# Patient Record
Sex: MALE | Race: WHITE | Employment: FULL TIME | URBAN - METROPOLITAN AREA
[De-identification: names, ages, dates, MRNs, and addresses within clinical notes are randomized per-mention and may not be internally consistent; named-entity substitution may affect disease eponyms.]

---

## 2022-04-17 ENCOUNTER — APPOINTMENT (OUTPATIENT)
Dept: GENERAL RADIOLOGY | Age: 47
End: 2022-04-17
Attending: EMERGENCY MEDICINE
Payer: COMMERCIAL

## 2022-04-17 ENCOUNTER — HOSPITAL ENCOUNTER (EMERGENCY)
Age: 47
Discharge: HOME | End: 2022-04-17
Attending: EMERGENCY MEDICINE | Admitting: EMERGENCY MEDICINE
Payer: COMMERCIAL

## 2022-04-17 ENCOUNTER — APPOINTMENT (OUTPATIENT)
Dept: CT IMAGING | Age: 47
End: 2022-04-17
Attending: EMERGENCY MEDICINE
Payer: COMMERCIAL

## 2022-04-17 VITALS
HEART RATE: 61 BPM | SYSTOLIC BLOOD PRESSURE: 126 MMHG | BODY MASS INDEX: 23.67 KG/M2 | DIASTOLIC BLOOD PRESSURE: 75 MMHG | HEIGHT: 70 IN | TEMPERATURE: 97.8 F | WEIGHT: 165.34 LBS | RESPIRATION RATE: 20 BRPM | OXYGEN SATURATION: 97 %

## 2022-04-17 DIAGNOSIS — S42.031A CLOSED DISPLACED FRACTURE OF ACROMIAL END OF RIGHT CLAVICLE, INITIAL ENCOUNTER: Primary | ICD-10-CM

## 2022-04-17 LAB
ALBUMIN SERPL-MCNC: 3.8 G/DL (ref 3.5–5)
ALBUMIN/GLOB SERPL: 1.3 {RATIO} (ref 1.1–2.2)
ALP SERPL-CCNC: 103 U/L (ref 45–117)
ALT SERPL-CCNC: 39 U/L (ref 12–78)
ANION GAP SERPL CALC-SCNC: 3 MMOL/L (ref 5–15)
AST SERPL-CCNC: 23 U/L (ref 15–37)
BASOPHILS # BLD: 0.1 K/UL (ref 0–0.1)
BASOPHILS NFR BLD: 1 % (ref 0–1)
BILIRUB SERPL-MCNC: 0.5 MG/DL (ref 0.2–1)
BUN SERPL-MCNC: 15 MG/DL (ref 6–20)
BUN/CREAT SERPL: 18 (ref 12–20)
CALCIUM SERPL-MCNC: 8.9 MG/DL (ref 8.5–10.1)
CHLORIDE SERPL-SCNC: 108 MMOL/L (ref 97–108)
CO2 SERPL-SCNC: 26 MMOL/L (ref 21–32)
CREAT SERPL-MCNC: 0.82 MG/DL (ref 0.7–1.3)
DIFFERENTIAL METHOD BLD: ABNORMAL
EOSINOPHIL # BLD: 0.1 K/UL (ref 0–0.4)
EOSINOPHIL NFR BLD: 1 % (ref 0–7)
ERYTHROCYTE [DISTWIDTH] IN BLOOD BY AUTOMATED COUNT: 11.5 % (ref 11.5–14.5)
GLOBULIN SER CALC-MCNC: 3 G/DL (ref 2–4)
GLUCOSE SERPL-MCNC: 105 MG/DL (ref 65–100)
HCT VFR BLD AUTO: 37.7 % (ref 36.6–50.3)
HGB BLD-MCNC: 13.3 G/DL (ref 12.1–17)
IMM GRANULOCYTES # BLD AUTO: 0.1 K/UL (ref 0–0.04)
IMM GRANULOCYTES NFR BLD AUTO: 1 % (ref 0–0.5)
LYMPHOCYTES # BLD: 1.5 K/UL (ref 0.8–3.5)
LYMPHOCYTES NFR BLD: 11 % (ref 12–49)
MCH RBC QN AUTO: 32 PG (ref 26–34)
MCHC RBC AUTO-ENTMCNC: 35.3 G/DL (ref 30–36.5)
MCV RBC AUTO: 90.8 FL (ref 80–99)
MONOCYTES # BLD: 0.8 K/UL (ref 0–1)
MONOCYTES NFR BLD: 7 % (ref 5–13)
NEUTS SEG # BLD: 10.3 K/UL (ref 1.8–8)
NEUTS SEG NFR BLD: 79 % (ref 32–75)
NRBC # BLD: 0 K/UL (ref 0–0.01)
NRBC BLD-RTO: 0 PER 100 WBC
PLATELET # BLD AUTO: 213 K/UL (ref 150–400)
PMV BLD AUTO: 9.4 FL (ref 8.9–12.9)
POTASSIUM SERPL-SCNC: 4 MMOL/L (ref 3.5–5.1)
PROT SERPL-MCNC: 6.8 G/DL (ref 6.4–8.2)
RBC # BLD AUTO: 4.15 M/UL (ref 4.1–5.7)
SODIUM SERPL-SCNC: 137 MMOL/L (ref 136–145)
TROPONIN-HIGH SENSITIVITY: 11 NG/L (ref 0–76)
WBC # BLD AUTO: 12.9 K/UL (ref 4.1–11.1)

## 2022-04-17 PROCEDURE — 99285 EMERGENCY DEPT VISIT HI MDM: CPT

## 2022-04-17 PROCEDURE — 84484 ASSAY OF TROPONIN QUANT: CPT

## 2022-04-17 PROCEDURE — 96375 TX/PRO/DX INJ NEW DRUG ADDON: CPT

## 2022-04-17 PROCEDURE — 73080 X-RAY EXAM OF ELBOW: CPT

## 2022-04-17 PROCEDURE — 36415 COLL VENOUS BLD VENIPUNCTURE: CPT

## 2022-04-17 PROCEDURE — 74011000636 HC RX REV CODE- 636: Performed by: STUDENT IN AN ORGANIZED HEALTH CARE EDUCATION/TRAINING PROGRAM

## 2022-04-17 PROCEDURE — 73030 X-RAY EXAM OF SHOULDER: CPT

## 2022-04-17 PROCEDURE — 96374 THER/PROPH/DIAG INJ IV PUSH: CPT

## 2022-04-17 PROCEDURE — 74011250637 HC RX REV CODE- 250/637: Performed by: EMERGENCY MEDICINE

## 2022-04-17 PROCEDURE — 74011250636 HC RX REV CODE- 250/636: Performed by: EMERGENCY MEDICINE

## 2022-04-17 PROCEDURE — 80053 COMPREHEN METABOLIC PANEL: CPT

## 2022-04-17 PROCEDURE — 85025 COMPLETE CBC W/AUTO DIFF WBC: CPT

## 2022-04-17 PROCEDURE — 71260 CT THORAX DX C+: CPT

## 2022-04-17 RX ORDER — OXYCODONE AND ACETAMINOPHEN 5; 325 MG/1; MG/1
1 TABLET ORAL
Status: COMPLETED | OUTPATIENT
Start: 2022-04-17 | End: 2022-04-17

## 2022-04-17 RX ORDER — MORPHINE SULFATE 10 MG/ML
6 INJECTION, SOLUTION INTRAMUSCULAR; INTRAVENOUS
Status: COMPLETED | OUTPATIENT
Start: 2022-04-17 | End: 2022-04-17

## 2022-04-17 RX ORDER — MORPHINE SULFATE 10 MG/ML
INJECTION, SOLUTION INTRAMUSCULAR; INTRAVENOUS
Status: DISCONTINUED
Start: 2022-04-17 | End: 2022-04-17 | Stop reason: HOSPADM

## 2022-04-17 RX ORDER — HYDROMORPHONE HYDROCHLORIDE 1 MG/ML
1 INJECTION, SOLUTION INTRAMUSCULAR; INTRAVENOUS; SUBCUTANEOUS
Status: COMPLETED | OUTPATIENT
Start: 2022-04-17 | End: 2022-04-17

## 2022-04-17 RX ORDER — OXYCODONE AND ACETAMINOPHEN 5; 325 MG/1; MG/1
1 TABLET ORAL
Qty: 30 TABLET | Refills: 0 | Status: SHIPPED | OUTPATIENT
Start: 2022-04-17 | End: 2022-04-19 | Stop reason: CLARIF

## 2022-04-17 RX ADMIN — HYDROMORPHONE HYDROCHLORIDE 1 MG: 1 INJECTION, SOLUTION INTRAMUSCULAR; INTRAVENOUS; SUBCUTANEOUS at 15:37

## 2022-04-17 RX ADMIN — IOPAMIDOL 100 ML: 612 INJECTION, SOLUTION INTRAVENOUS at 15:14

## 2022-04-17 RX ADMIN — OXYCODONE AND ACETAMINOPHEN 1 TABLET: 5; 325 TABLET ORAL at 17:09

## 2022-04-17 RX ADMIN — MORPHINE SULFATE 6 MG: 10 INJECTION INTRAVENOUS at 13:56
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
## 2022-04-18 PROBLEM — S42.031D CLOSED DISPLACED FRACTURE OF ACROMIAL END OF RIGHT CLAVICLE WITH ROUTINE HEALING: Status: ACTIVE | Noted: 2022-04-18
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
## 2022-04-19 ENCOUNTER — OFFICE VISIT (OUTPATIENT)
Dept: ORTHOPEDIC SURGERY | Age: 47
End: 2022-04-19
Payer: COMMERCIAL

## 2022-04-19 VITALS — WEIGHT: 150 LBS | HEIGHT: 70 IN | BODY MASS INDEX: 21.47 KG/M2

## 2022-04-19 DIAGNOSIS — S42.031D CLOSED DISPLACED FRACTURE OF ACROMIAL END OF RIGHT CLAVICLE WITH ROUTINE HEALING: Primary | ICD-10-CM

## 2022-04-19 PROCEDURE — 99204 OFFICE O/P NEW MOD 45 MIN: CPT | Performed by: ORTHOPAEDIC SURGERY

## 2022-04-19 RX ORDER — CEPHALEXIN 500 MG/1
500 CAPSULE ORAL 4 TIMES DAILY
Qty: 30 CAPSULE | Refills: 0 | Status: SHIPPED | OUTPATIENT
Start: 2022-04-19 | End: 2022-04-29

## 2022-04-19 RX ORDER — FLUTICASONE PROPIONATE AND SALMETEROL 50; 250 UG/1; UG/1
POWDER RESPIRATORY (INHALATION)
COMMUNITY
Start: 2022-02-18

## 2022-04-19 RX ORDER — CETIRIZINE HCL 10 MG
10 TABLET ORAL DAILY
COMMUNITY
Start: 2022-01-28

## 2022-04-19 RX ORDER — ESCITALOPRAM OXALATE 10 MG/1
TABLET ORAL
COMMUNITY
Start: 2022-01-28

## 2022-04-19 RX ORDER — OXYCODONE AND ACETAMINOPHEN 5; 325 MG/1; MG/1
1 TABLET ORAL
Qty: 42 TABLET | Refills: 0 | Status: SHIPPED | OUTPATIENT
Start: 2022-04-19 | End: 2022-04-25

## 2022-04-25 DIAGNOSIS — Z09 S/P ORTHOPEDIC SURGERY, FOLLOW-UP EXAM: Primary | ICD-10-CM

## 2022-04-25 DIAGNOSIS — S42.031D CLOSED DISPLACED FRACTURE OF ACROMIAL END OF RIGHT CLAVICLE WITH ROUTINE HEALING: ICD-10-CM

## 2022-04-25 RX ORDER — OXYCODONE AND ACETAMINOPHEN 5; 325 MG/1; MG/1
1 TABLET ORAL
Qty: 28 TABLET | Refills: 0 | Status: SHIPPED | OUTPATIENT
Start: 2022-04-25 | End: 2022-04-29

## 2022-04-25 NOTE — TELEPHONE ENCOUNTER
Received a call from wife asking for a refill on patient's pain med. We do not have signed consent to speak with wife. I phoned patient and advised we could send in a few but he needs to reduce the amount he is taking. I advised him that he could also take up to 800mg Ibuprofen q 8hrs. Patient stated he had been taking 2 percocet q 4hrs. I advised 1 percoceet q 8hrs along with the ibuprofen alternating the 2. Dr. Elizabeth Moreno agrees with plan. I also advised patient that insurance may not cover pain med right yet as it is too soon.

## 2022-04-27 NOTE — PROGRESS NOTES
ASSESSMENT/PLAN:  Below is the assessment and plan developed based on review of pertinent history, physical exam, labs, studies, and medications. 1. Closed displaced fracture of acromial end of right clavicle with routine healing  -     XR CLAVICLE RT; Future      Return in about 3 weeks (around 2022). After discussing treatment options, we have decided to proceed with a home exercise program for rehabilitation of the shoulder. We reviewed active elbow range of motion and postural exercises today in the office. We went over the arthroscopic pictures and during today´s visit. He has absorbable sutures and Steri-Strips in place. He understands that these will fall off on their own. We will continue with ice and elevation of the shoulder to decrease swelling and pain. We will continue to utilize early mobilization and mechanical prophylaxis to reduce the chances of a deep vein thrombosis. We will wean them off any narcotic medications and progress to anti-inflammatories and Tylenol as long as there are no contraindications to these medications. We also discussed the risk and benefits and common side effects of taking these medications at today´s visit. We also had a discussion regarding not driving while on narcotic medications and while impaired from a surgical or medical condition. I will see them back in three weeks time to evaluate their progress. They will call us in the interim if they have any questions or concerns prior to their follow up visit. SUBJECTIVE/OBJECTIVE:  Truong Parra (: 1975) is a 49 y.o. male, patient,here for evaluation of the Surgical Follow-up and Clavicle pain (right)  . Patient returns today for follow-up of his right shoulder. He underwent right shoulder open reduction internal fixation of his distal third clavicle fracture on 2022. He has been in his sling. Has been icing elevating. He denies any numbness tingling erythema or warmth.   He states his pain is greatly improved over the last few days. Sleep is still difficult for him. He is trying to wean off of the narcotics and switch over to anti-inflammatories. States he is mostly just taking narcotics at night to help with sleep but is taking Advil during the day. Physical Exam    Examination of the operative shoulder reveals that the incisions are healing well, without evidence of drainage, erythema or warmth. There is limited range of motion secondary to discomfort. Strength is 5/5 distally. There is no tenderness at the elbow and wrist. Sensation is intact to light touch distally and there is a brisk capillary refill. Imagin views of the right clavicle show good alignment of his fracture. No Known Allergies    Current Outpatient Medications   Medication Sig    cetirizine (ZYRTEC) 10 mg tablet Take 10 mg by mouth daily.  escitalopram oxalate (LEXAPRO) 10 mg tablet     Advair Diskus 250-50 mcg/dose diskus inhaler INHALE 1 PUFF BY MOUTH TWICE DAILY. RINSE MOUTH WITH WATER AFTER USE FOR AFTERTASTE AND INCIDENCE OF CANDIDIASIS. DO NOT SWALLOW     No current facility-administered medications for this visit. Past Medical History:   Diagnosis Date    Asthma        History reviewed. No pertinent surgical history. History reviewed. No pertinent family history.     Social History     Socioeconomic History    Marital status:      Spouse name: Not on file    Number of children: Not on file    Years of education: Not on file    Highest education level: Not on file   Occupational History    Not on file   Tobacco Use    Smoking status: Not on file    Smokeless tobacco: Never Used   Vaping Use    Vaping Use: Never used   Substance and Sexual Activity    Alcohol use: Never    Drug use: Yes     Types: Marijuana    Sexual activity: Not on file   Other Topics Concern    Not on file   Social History Narrative    Not on file     Social Determinants of Health Financial Resource Strain:     Difficulty of Paying Living Expenses: Not on file   Food Insecurity:     Worried About Running Out of Food in the Last Year: Not on file    [place] of Food in the Last Year: Not on file   Transportation Needs:     Lack of Transportation (Medical): Not on file    Lack of Transportation (Non-Medical): Not on file   Physical Activity:     Days of Exercise per Week: Not on file    Minutes of Exercise per Session: Not on file   Stress:     Feeling of Stress : Not on file   Social Connections:     Frequency of Communication with Friends and Family: Not on file    Frequency of Social Gatherings with Friends and Family: Not on file    Attends Sabianism Services: Not on file    Active Member of [address] [place] [address] or Organizations: Not on file    Attends Club or Organization Meetings: Not on file    Marital Status: Not on file   Intimate Partner Violence:     Fear of Current or Ex-Partner: Not on file    Emotionally Abused: Not on file    Physically Abused: Not on file    Sexually Abused: Not on file   Housing Stability:     Unable to Pay for Housing in the Last Year: Not on file    Number of [place] in the Last Year: Not on file    Unstable Housing in the Last Year: Not on file       Review of Systems    No flowsheet data found. Vitals:  Ht 5' 10\" (1.778 m)   Wt 150 lb (68 kg)   BMI 21.52 kg/m²    Body mass index is 21.52 kg/m². An electronic signature was used to authenticate this note.   -- Jersey Chaudhary MD

## 2022-04-29 ENCOUNTER — OFFICE VISIT (OUTPATIENT)
Dept: ORTHOPEDIC SURGERY | Age: 47
End: 2022-04-29
Payer: COMMERCIAL

## 2022-04-29 VITALS — WEIGHT: 150 LBS | BODY MASS INDEX: 21.47 KG/M2 | HEIGHT: 70 IN

## 2022-04-29 DIAGNOSIS — S42.031D CLOSED DISPLACED FRACTURE OF ACROMIAL END OF RIGHT CLAVICLE WITH ROUTINE HEALING: Primary | ICD-10-CM

## 2022-04-29 PROCEDURE — 99024 POSTOP FOLLOW-UP VISIT: CPT | Performed by: ORTHOPAEDIC SURGERY

## 2022-05-15 NOTE — PROGRESS NOTES
ASSESSMENT/PLAN:  Below is the assessment and plan developed based on review of pertinent history, physical exam, labs, studies, and medications. 1. Closed displaced fracture of acromial end of right clavicle with routine healing  -     XR CLAVICLE RT; Future      Return in about 4 weeks    After discussing treatment options, we have decided to proceed with a home exercise program for rehabilitation of the shoulder. We reviewed active elbow range of motion and postural exercises today in the office. We went over the arthroscopic pictures and during today´s visit. He has absorbable sutures and Steri-Strips in place. He understands that these will fall off on their own. We will continue with ice and elevation of the shoulder to decrease swelling and pain. We will continue to utilize early mobilization and mechanical prophylaxis to reduce the chances of a deep vein thrombosis. We will wean them off any narcotic medications and progress to anti-inflammatories and Tylenol as long as there are no contraindications to these medications. We also discussed the risk and benefits and common side effects of taking these medications at today´s visit. We also had a discussion regarding not driving while on narcotic medications and while impaired from a surgical or medical condition. I will see them back in three weeks time to evaluate their progress. They will call us in the interim if they have any questions or concerns prior to their follow up visit. SUBJECTIVE/OBJECTIVE:  Juany Tariq (: 1975) is a 49 y.o. male, patient,here for evaluation of the Surgical Follow-up and Clavicle pain (right)  . Patient returns today for follow-up of his right shoulder. He underwent right shoulder open reduction internal fixation of his distal third clavicle fracture on 2022. He has been in his sling. Has been icing elevating. He does have some numbness below the incision.   He states his pain is greatly improved over the last few days. Physical Exam    Examination of the operative shoulder reveals that the incisions are healing well, without evidence of drainage, erythema or warmth. There is limited range of motion secondary to discomfort. Strength is 5/5 distally. There is no tenderness at the elbow and wrist. Sensation is intact to light touch distally and there is a brisk capillary refill. Imagin views of the right clavicle show good alignment of his fracture without any interval change from previous x-rays. No Known Allergies    Current Outpatient Medications   Medication Sig    cetirizine (ZYRTEC) 10 mg tablet Take 10 mg by mouth daily.  escitalopram oxalate (LEXAPRO) 10 mg tablet     Advair Diskus 250-50 mcg/dose diskus inhaler INHALE 1 PUFF BY MOUTH TWICE DAILY. RINSE MOUTH WITH WATER AFTER USE FOR AFTERTASTE AND INCIDENCE OF CANDIDIASIS. DO NOT SWALLOW     No current facility-administered medications for this visit. Past Medical History:   Diagnosis Date    Asthma        History reviewed. No pertinent surgical history. History reviewed. No pertinent family history.     Social History     Socioeconomic History    Marital status:      Spouse name: Not on file    Number of children: Not on file    Years of education: Not on file    Highest education level: Not on file   Occupational History    Not on file   Tobacco Use    Smoking status: Not on file    Smokeless tobacco: Never Used   Vaping Use    Vaping Use: Never used   Substance and Sexual Activity    Alcohol use: Never    Drug use: Yes     Types: Marijuana    Sexual activity: Not on file   Other Topics Concern    Not on file   Social History Narrative    Not on file     Social Determinants of Health     Financial Resource Strain:     Difficulty of Paying Living Expenses: Not on file   Food Insecurity:     Worried About [address] in the Last Year: Not on file    [address] in the Last Year: Not on file   Transportation Needs:     Lack of Transportation (Medical): Not on file    Lack of Transportation (Non-Medical): Not on file   Physical Activity:     Days of Exercise per Week: Not on file    Minutes of Exercise per Session: Not on file   Stress:     Feeling of Stress : Not on file   Social Connections:     Frequency of Communication with Friends and Family: Not on file    Frequency of Social Gatherings with Friends and Family: Not on file    Attends Yarsani Services: Not on file    Active Member of [address] [place] [address] [employer] or Organizations: Not on file    Attends Club or Organization Meetings: Not on file    Marital Status: Not on file   Intimate Partner Violence:     Fear of Current or Ex-Partner: Not on file    Emotionally Abused: Not on file    Physically Abused: Not on file    Sexually Abused: Not on file   Housing Stability:     Unable to Pay for Housing in the Last Year: Not on file    Number of [place] in the Last Year: Not on file    Unstable Housing in the Last Year: Not on file       Review of Systems    No flowsheet data found. Vitals:  Ht 5' 10\" (1.778 m)   Wt 150 lb (68 kg)   BMI 21.52 kg/m²    Body mass index is 21.52 kg/m². An electronic signature was used to authenticate this note.   -- Ike Urrutia MD

## 2022-05-16 ENCOUNTER — OFFICE VISIT (OUTPATIENT)
Dept: ORTHOPEDIC SURGERY | Age: 47
End: 2022-05-16
Payer: COMMERCIAL

## 2022-05-16 VITALS — HEIGHT: 70 IN | WEIGHT: 150 LBS | BODY MASS INDEX: 21.47 KG/M2

## 2022-05-16 DIAGNOSIS — S42.031D CLOSED DISPLACED FRACTURE OF ACROMIAL END OF RIGHT CLAVICLE WITH ROUTINE HEALING: Primary | ICD-10-CM

## 2022-05-16 PROCEDURE — 99024 POSTOP FOLLOW-UP VISIT: CPT | Performed by: ORTHOPAEDIC SURGERY

## 2022-06-15 ENCOUNTER — OFFICE VISIT (OUTPATIENT)
Dept: ORTHOPEDIC SURGERY | Age: 47
End: 2022-06-15
Payer: COMMERCIAL

## 2022-06-15 VITALS — BODY MASS INDEX: 21.47 KG/M2 | HEIGHT: 70 IN | WEIGHT: 150 LBS

## 2022-06-15 DIAGNOSIS — S42.031D CLOSED DISPLACED FRACTURE OF ACROMIAL END OF RIGHT CLAVICLE WITH ROUTINE HEALING: Primary | ICD-10-CM

## 2022-06-15 DIAGNOSIS — M25.521 ELBOW PAIN, RIGHT: ICD-10-CM

## 2022-06-15 PROCEDURE — 99024 POSTOP FOLLOW-UP VISIT: CPT | Performed by: ORTHOPAEDIC SURGERY

## 2022-06-15 NOTE — PROGRESS NOTES
ASSESSMENT/PLAN:  Below is the assessment and plan developed based on review of pertinent history, physical exam, labs, studies, and medications. 1. Closed displaced fracture of acromial end of right clavicle with routine healing  -     XR CLAVICLE RT; Future      Return in about 4 weeks    After discussing treatment options, we have decided to proceed with a home exercise program for rehabilitation of the shoulder. We reviewed active elbow range of motion and postural exercises today in the office. We went over the arthroscopic pictures and during today´s visit. He has absorbable sutures and Steri-Strips in place. He understands that these will fall off on their own. We will continue with ice and elevation of the shoulder to decrease swelling and pain. We will continue to utilize early mobilization and mechanical prophylaxis to reduce the chances of a deep vein thrombosis. We will wean them off any narcotic medications and progress to anti-inflammatories and Tylenol as long as there are no contraindications to these medications. We also discussed the risk and benefits and common side effects of taking these medications at today´s visit. We also had a discussion regarding not driving while on narcotic medications and while impaired from a surgical or medical condition. I will see them back in three weeks time to evaluate their progress. They will call us in the interim if they have any questions or concerns prior to their follow up visit. Given that the patient's symptoms are increasing in frequency and duration we have decided to prescribe physical therapy. We talked about the fact that the goal of physical therapy is for the therapist to assist in developing a program to help return the patient to full strength, function and mobility and decrease pain. We also discussed that the therapist may combine several techniques to help decrease pain.   These include but are not limited to stretching, balance exercises, strength training, massage, cold and heat therapy, and electrical stimulation. Although, physical therapy is generally safe, we went over the potential risks to include the worsening of pre-existing conditions, continued pain and no improvement in flexibility, mobility, and strength. We will have the patient follow up after physical therapy to closely monitor their progress. We talked about following up sooner if therapy is not progressing on a weekly basis. We talked about the fact that the sling was irritating his elbow bursitis. We will discontinue the sling and start him with a physical therapy. We talked about the fact that he would most likely need plate removal in the future but we wanted to make sure he had a solid union prior to removal.    SUBJECTIVE/OBJECTIVE:  Apurva Mendez (: 1975) is a 49 y.o. male, patient,here for evaluation of the Surgical Follow-up and Clavicle pain (right)  . Patient returns today for follow-up of his right shoulder. He underwent right shoulder open reduction internal fixation of his distal third clavicle fracture on 2022. He has been in his sling. Has been icing elevating. He does have some numbness below the incision. He states his pain is greatly improved over the last few days. Physical Exam    Examination of the operative shoulder reveals that the incisions are healing well, without evidence of drainage, erythema or warmth. There is limited range of motion secondary to discomfort. Strength is 5/5 distally. There is no tenderness at the elbow and wrist. Sensation is intact to light touch distally and there is a brisk capillary refill. Imagin views of the right clavicle show good alignment of his fracture without any interval change from previous x-rays. No Known Allergies    Current Outpatient Medications   Medication Sig    cetirizine (ZYRTEC) 10 mg tablet Take 10 mg by mouth daily.     escitalopram oxalate (LEXAPRO) 10 mg tablet     Advair Diskus 250-50 mcg/dose diskus inhaler INHALE 1 PUFF BY MOUTH TWICE DAILY. RINSE MOUTH WITH WATER AFTER USE FOR AFTERTASTE AND INCIDENCE OF CANDIDIASIS. DO NOT SWALLOW     No current facility-administered medications for this visit. Past Medical History:   Diagnosis Date    Asthma        History reviewed. No pertinent surgical history. History reviewed. No pertinent family history. Social History     Socioeconomic History    Marital status:      Spouse name: Not on file    Number of children: Not on file    Years of education: Not on file    Highest education level: Not on file   Occupational History    Not on file   Tobacco Use    Smoking status: Not on file    Smokeless tobacco: Never Used   Vaping Use    Vaping Use: Never used   Substance and Sexual Activity    Alcohol use: Never    Drug use: Yes     Types: Marijuana    Sexual activity: Not on file   Other Topics Concern    Not on file   Social History Narrative    Not on file     Social Determinants of Health     Financial Resource Strain:     Difficulty of Paying Living Expenses: Not on file   Food Insecurity:     Worried About Running Out of Food in the Last Year: Not on file    Jackson of Food in the Last Year: Not on file   Transportation Needs:     Lack of Transportation (Medical): Not on file    Lack of Transportation (Non-Medical):  Not on file   Physical Activity:     Days of Exercise per Week: Not on file    Minutes of Exercise per Session: Not on file   Stress:     Feeling of Stress : Not on file   Social Connections:     Frequency of Communication with Friends and Family: Not on file    Frequency of Social Gatherings with Friends and Family: Not on file    Attends [employer] Services: Not on file    Active Member of Clubs or Organizations: Not on file    Attends Club or Organization Meetings: Not on file    Marital Status: Not on file   Intimate Partner Violence:     Fear of Current or Ex-Partner: Not on file    Emotionally Abused: Not on file    Physically Abused: Not on file    Sexually Abused: Not on file   Housing Stability:     Unable to Pay for Housing in the Last Year: Not on file    Number of Places Lived in the Last Year: Not on file    Unstable Housing in the Last Year: Not on file       Review of Systems    No flowsheet data found. Vitals:  Ht 5' 10\" (1.778 m)   Wt 150 lb (68 kg)   BMI 21.52 kg/m²    Body mass index is 21.52 kg/m². An electronic signature was used to authenticate this note.   -- Jimi Colindres MD

## 2024-01-29 PROBLEM — M54.2 NECK PAIN: Status: ACTIVE | Noted: 2024-01-29

## 2024-01-29 PROBLEM — M54.12 CERVICAL RADICULITIS: Status: ACTIVE | Noted: 2024-01-29

## 2024-02-05 ENCOUNTER — HOSPITAL ENCOUNTER (OUTPATIENT)
Facility: HOSPITAL | Age: 49
Discharge: HOME | End: 2024-02-08
Payer: COMMERCIAL

## 2024-02-05 ENCOUNTER — ANESTHESIA EVENT (OUTPATIENT)
Facility: HOSPITAL | Age: 49
End: 2024-02-05
Payer: COMMERCIAL

## 2024-02-05 VITALS
HEIGHT: 71 IN | RESPIRATION RATE: 16 BRPM | WEIGHT: 159.6 LBS | BODY MASS INDEX: 22.34 KG/M2 | TEMPERATURE: 98 F | HEART RATE: 80 BPM | SYSTOLIC BLOOD PRESSURE: 123 MMHG | DIASTOLIC BLOOD PRESSURE: 72 MMHG

## 2024-02-05 LAB
ABO + RH BLD: NORMAL
APPEARANCE UR: CLEAR
BACTERIA URNS QL MICRO: NEGATIVE /HPF
BILIRUB UR QL: NEGATIVE
BLOOD GROUP ANTIBODIES SERPL: NORMAL
COLOR UR: NORMAL
EPITH CASTS URNS QL MICRO: NORMAL /LPF
GLUCOSE UR STRIP.AUTO-MCNC: NEGATIVE MG/DL
HGB UR QL STRIP: NEGATIVE
HYALINE CASTS URNS QL MICRO: NORMAL /LPF (ref 0–5)
KETONES UR QL STRIP.AUTO: NEGATIVE MG/DL
LEUKOCYTE ESTERASE UR QL STRIP.AUTO: NEGATIVE
NITRITE UR QL STRIP.AUTO: NEGATIVE
PH UR STRIP: 5.5 (ref 5–8)
PROT UR STRIP-MCNC: NEGATIVE MG/DL
RBC #/AREA URNS HPF: NORMAL /HPF (ref 0–5)
SP GR UR REFRACTOMETRY: 1.02 (ref 1–1.03)
SPECIMEN EXP DATE BLD: NORMAL
URINE CULTURE IF INDICATED: NORMAL
UROBILINOGEN UR QL STRIP.AUTO: 0.2 EU/DL (ref 0.2–1)
WBC URNS QL MICRO: NORMAL /HPF (ref 0–4)

## 2024-02-05 PROCEDURE — 81001 URINALYSIS AUTO W/SCOPE: CPT

## 2024-02-05 PROCEDURE — 36415 COLL VENOUS BLD VENIPUNCTURE: CPT

## 2024-02-05 PROCEDURE — 86901 BLOOD TYPING SEROLOGIC RH(D): CPT

## 2024-02-05 PROCEDURE — 86900 BLOOD TYPING SEROLOGIC ABO: CPT

## 2024-02-05 PROCEDURE — 86850 RBC ANTIBODY SCREEN: CPT

## 2024-02-05 RX ORDER — GUAIFENESIN 400 MG/1
400 TABLET ORAL 4 TIMES DAILY PRN
COMMUNITY

## 2024-02-05 RX ORDER — ACETAMINOPHEN 500 MG
1000 TABLET ORAL EVERY 6 HOURS PRN
COMMUNITY

## 2024-02-05 RX ORDER — ALBUTEROL SULFATE 90 UG/1
2 AEROSOL, METERED RESPIRATORY (INHALATION) EVERY 6 HOURS PRN
COMMUNITY

## 2024-02-05 ASSESSMENT — PAIN SCALES - GENERAL: PAINLEVEL_OUTOF10: 4

## 2024-02-05 ASSESSMENT — PAIN DESCRIPTION - LOCATION: LOCATION: ARM

## 2024-02-05 ASSESSMENT — PAIN DESCRIPTION - DESCRIPTORS: DESCRIPTORS: ACHING;BURNING;SHARP

## 2024-02-05 ASSESSMENT — PAIN DESCRIPTION - PAIN TYPE: TYPE: ACUTE PAIN

## 2024-02-05 ASSESSMENT — PAIN DESCRIPTION - FREQUENCY: FREQUENCY: CONTINUOUS

## 2024-02-05 ASSESSMENT — PAIN DESCRIPTION - ORIENTATION: ORIENTATION: RIGHT

## 2024-02-05 NOTE — H&P
Expand All Collapse All            William Us (: 1975) is a 48 y.o. male, patient, here for evaluation of the following chief complaint(s):  Neck Pain (New patient,MRI  C Spine completed.)        ASSESSMENT/PLAN:     Below is the assessment and plan developed based on review of pertinent history, physical exam, labs, studies, and medications.     He has a severe right-sided C6 radiculopathy from a disc protrusion at C5-6.  Is causing severe lateral recess and foraminal stenosis and correlates with his symptoms.  He had symptoms for the last 3 months without significant improvement despite physical therapy and pain medications.  I think his next candidate for a cervical decompression at C5-6.  Given his young age and activity level and isolated disease I also think is a candidate for cervical disc arthroplasty at that level.  Risk and benefits were discussed with him.     Procedure: C5-6 disc arthroplasty        The risks and benefits were discussed at length with the patient and the patient has elected to proceed.  Indications for surgery include failed conservative treatment.  Alternative treatments, risks and the perioperative course were discussed with the patient.  All questions were answered.  The risks and benefits of the procedure were explained.  Benefits include definitive diagnosis, relief of pain, elimination of deformity and improved function.  Risks of surgery including bleeding, infection, weakness, numbness, CSF leak, failure to improve symptoms, exacerbation of medical co-morbidities and even death were discussed with the patient.      1. Neck pain  -     XR CERVICAL SPINE (2-3 VIEWS); Future  2. Cervical radiculopathy at C5  -     XR CERVICAL SPINE (2-3 VIEWS); Future  -     gabapentin (NEURONTIN) 300 MG capsule; Take 1 capsule by mouth nightly for 30 days. Intended supply: 30 days Max Daily Amount: 300 mg, Disp-30 capsule, R-0Normal  -     HYDROcodone-acetaminophen (NORCO) 5-325 MG

## 2024-02-05 NOTE — PERIOP NOTE
[facility] PREOPERATIVE INSTRUCTIONS  ORTHOPAEDIC    Surgery Date:   2/6/24    Your surgeon's office or [facility] staff will call you between 4 PM- 8 PM the day before surgery with your arrival time.  If your surgery is on a Monday, you will receive a call the preceding Friday. If your surgeon's office has given you, your arrival time then go by that time.    Please report to [facility] Patient Access/Admitting on the 1st floor.  Bring your insurance card, photo identification, and any copayment (if applicable).   If you are going home the same day of your surgery, you must have a responsible adult to drive you home. You need to have a responsible adult to stay with you the first 24 hours after surgery and you should not drive a car for 24 hours following your surgery.  If you are being admitted to the hospital,please leave personal belongings/luggage in your car until you have an assigned hospital room number.  Please wear comfortable clothes. Wear your glasses instead of contacts. We ask that all money, jewelry and valuables be left at home. Wear no make up, particularly mascara, the day of surgery.  Do NOT drink alcohol or smoke 24 hours before surgery. STOP smoking for 14 days prior as it helps with breathing and healing after surgery.   All body piercings, rings, and jewelry need to be removed and left at home. Do not wear any fingernail polish except for clear. Please wear your hair loose or down. Please no pony-tails, buns, or any metal hair accessories. You may wear deodorant. Do not shave any body area within 24 hours of your surgery.  Please follow all instructions to avoid any potential surgical cancellation.  Should your physical condition change, (i.e. fever, cold, flu, etc.) please notify your surgeon as soon as possible.  It is important to be on time. If a situation occurs where you may be delayed, please call:  [phone] / [phone] on the day of surgery.  The Preadmission

## 2024-02-06 ENCOUNTER — ANESTHESIA (OUTPATIENT)
Facility: HOSPITAL | Age: 49
End: 2024-02-06
Payer: COMMERCIAL

## 2024-02-06 ENCOUNTER — APPOINTMENT (OUTPATIENT)
Facility: HOSPITAL | Age: 49
End: 2024-02-06
Attending: ORTHOPAEDIC SURGERY
Payer: COMMERCIAL

## 2024-02-06 ENCOUNTER — HOSPITAL ENCOUNTER (OUTPATIENT)
Facility: HOSPITAL | Age: 49
Discharge: HOME | End: 2024-02-06
Attending: ORTHOPAEDIC SURGERY | Admitting: ORTHOPAEDIC SURGERY
Payer: COMMERCIAL

## 2024-02-06 VITALS
DIASTOLIC BLOOD PRESSURE: 82 MMHG | HEIGHT: 71 IN | RESPIRATION RATE: 14 BRPM | SYSTOLIC BLOOD PRESSURE: 147 MMHG | HEART RATE: 72 BPM | TEMPERATURE: 97.9 F | WEIGHT: 159.6 LBS | BODY MASS INDEX: 22.34 KG/M2 | OXYGEN SATURATION: 98 %

## 2024-02-06 DIAGNOSIS — Z98.890 S/P CERVICAL DISC REPLACEMENT: Primary | ICD-10-CM

## 2024-02-06 PROBLEM — M48.02 CERVICAL STENOSIS OF SPINAL CANAL: Status: ACTIVE | Noted: 2024-02-06

## 2024-02-06 PROCEDURE — 6360000002 HC RX W HCPCS: Performed by: NURSE ANESTHETIST, CERTIFIED REGISTERED

## 2024-02-06 PROCEDURE — 2580000003 HC RX 258: Performed by: ANESTHESIOLOGY

## 2024-02-06 PROCEDURE — C1889 IMPLANT/INSERT DEVICE, NOC: HCPCS | Performed by: ORTHOPAEDIC SURGERY

## 2024-02-06 PROCEDURE — 7100000010 HC PHASE II RECOVERY - FIRST 15 MIN: Performed by: ORTHOPAEDIC SURGERY

## 2024-02-06 PROCEDURE — 3600000014 HC SURGERY LEVEL 4 ADDTL 15MIN: Performed by: ORTHOPAEDIC SURGERY

## 2024-02-06 PROCEDURE — 3700000001 HC ADD 15 MINUTES (ANESTHESIA): Performed by: ORTHOPAEDIC SURGERY

## 2024-02-06 PROCEDURE — 2720000010 HC SURG SUPPLY STERILE: Performed by: ORTHOPAEDIC SURGERY

## 2024-02-06 PROCEDURE — 6370000000 HC RX 637 (ALT 250 FOR IP): Performed by: ANESTHESIOLOGY

## 2024-02-06 PROCEDURE — 22856 TOT DISC ARTHRP 1NTRSPC CRV: CPT | Performed by: ORTHOPAEDIC SURGERY

## 2024-02-06 PROCEDURE — 7100000011 HC PHASE II RECOVERY - ADDTL 15 MIN: Performed by: ORTHOPAEDIC SURGERY

## 2024-02-06 PROCEDURE — 6360000002 HC RX W HCPCS: Performed by: ANESTHESIOLOGY

## 2024-02-06 PROCEDURE — 7100000000 HC PACU RECOVERY - FIRST 15 MIN: Performed by: ORTHOPAEDIC SURGERY

## 2024-02-06 PROCEDURE — 3600000004 HC SURGERY LEVEL 4 BASE: Performed by: ORTHOPAEDIC SURGERY

## 2024-02-06 PROCEDURE — 6360000002 HC RX W HCPCS: Performed by: PHYSICIAN ASSISTANT

## 2024-02-06 PROCEDURE — 2500000003 HC RX 250 WO HCPCS: Performed by: NURSE ANESTHETIST, CERTIFIED REGISTERED

## 2024-02-06 PROCEDURE — 2580000003 HC RX 258: Performed by: PHYSICIAN ASSISTANT

## 2024-02-06 PROCEDURE — 3700000000 HC ANESTHESIA ATTENDED CARE: Performed by: ORTHOPAEDIC SURGERY

## 2024-02-06 PROCEDURE — 2709999900 HC NON-CHARGEABLE SUPPLY: Performed by: ORTHOPAEDIC SURGERY

## 2024-02-06 PROCEDURE — 2580000003 HC RX 258: Performed by: STUDENT IN AN ORGANIZED HEALTH CARE EDUCATION/TRAINING PROGRAM

## 2024-02-06 PROCEDURE — 22856 TOT DISC ARTHRP 1NTRSPC CRV: CPT | Performed by: STUDENT IN AN ORGANIZED HEALTH CARE EDUCATION/TRAINING PROGRAM

## 2024-02-06 PROCEDURE — 7100000001 HC PACU RECOVERY - ADDTL 15 MIN: Performed by: ORTHOPAEDIC SURGERY

## 2024-02-06 PROCEDURE — 6360000002 HC RX W HCPCS

## 2024-02-06 PROCEDURE — 2500000003 HC RX 250 WO HCPCS: Performed by: ORTHOPAEDIC SURGERY

## 2024-02-06 DEVICE — GRAFT HUM TISS 3X4 CM WND COVERING AMNIO MEMBRN VERSASHIELD: Type: IMPLANTABLE DEVICE | Site: SPINE CERVICAL | Status: FUNCTIONAL

## 2024-02-06 DEVICE — PRESTIGE LP DISC 6X18MM
Type: IMPLANTABLE DEVICE | Site: SPINE CERVICAL | Status: FUNCTIONAL
Brand: PRESTIGE® LP CERVICAL DISC SYSTEM

## 2024-02-06 RX ORDER — FENTANYL CITRATE 50 UG/ML
INJECTION, SOLUTION INTRAMUSCULAR; INTRAVENOUS
Status: COMPLETED
Start: 2024-02-06 | End: 2024-02-06

## 2024-02-06 RX ORDER — SODIUM CHLORIDE 9 MG/ML
INJECTION, SOLUTION INTRAVENOUS PRN
Status: CANCELLED | OUTPATIENT
Start: 2024-02-06

## 2024-02-06 RX ORDER — PROCHLORPERAZINE EDISYLATE 5 MG/ML
5 INJECTION INTRAMUSCULAR; INTRAVENOUS
Status: DISCONTINUED | OUTPATIENT
Start: 2024-02-06 | End: 2024-02-06 | Stop reason: HOSPADM

## 2024-02-06 RX ORDER — LORAZEPAM 2 MG/ML
0.5 INJECTION INTRAMUSCULAR
Status: DISCONTINUED | OUTPATIENT
Start: 2024-02-06 | End: 2024-02-06 | Stop reason: HOSPADM

## 2024-02-06 RX ORDER — LIDOCAINE HYDROCHLORIDE 10 MG/ML
1 INJECTION, SOLUTION EPIDURAL; INFILTRATION; INTRACAUDAL; PERINEURAL
Status: DISCONTINUED | OUTPATIENT
Start: 2024-02-06 | End: 2024-02-06 | Stop reason: HOSPADM

## 2024-02-06 RX ORDER — LIDOCAINE HYDROCHLORIDE 20 MG/ML
INJECTION, SOLUTION EPIDURAL; INFILTRATION; INTRACAUDAL; PERINEURAL PRN
Status: DISCONTINUED | OUTPATIENT
Start: 2024-02-06 | End: 2024-02-06 | Stop reason: SDUPTHER

## 2024-02-06 RX ORDER — SODIUM CHLORIDE 9 MG/ML
INJECTION, SOLUTION INTRAVENOUS CONTINUOUS
Status: DISCONTINUED | OUTPATIENT
Start: 2024-02-06 | End: 2024-02-06 | Stop reason: HOSPADM

## 2024-02-06 RX ORDER — SUCCINYLCHOLINE/SOD CL,ISO/PF 200MG/10ML
SYRINGE (ML) INTRAVENOUS PRN
Status: DISCONTINUED | OUTPATIENT
Start: 2024-02-06 | End: 2024-02-06 | Stop reason: SDUPTHER

## 2024-02-06 RX ORDER — DIPHENHYDRAMINE HCL 25 MG
25 CAPSULE ORAL EVERY 6 HOURS PRN
Status: CANCELLED | OUTPATIENT
Start: 2024-02-06

## 2024-02-06 RX ORDER — HYDROMORPHONE HYDROCHLORIDE 1 MG/ML
0.5 INJECTION, SOLUTION INTRAMUSCULAR; INTRAVENOUS; SUBCUTANEOUS EVERY 5 MIN PRN
Status: COMPLETED | OUTPATIENT
Start: 2024-02-06 | End: 2024-02-06

## 2024-02-06 RX ORDER — SODIUM CHLORIDE, SODIUM LACTATE, POTASSIUM CHLORIDE, CALCIUM CHLORIDE 600; 310; 30; 20 MG/100ML; MG/100ML; MG/100ML; MG/100ML
INJECTION, SOLUTION INTRAVENOUS CONTINUOUS
Status: DISCONTINUED | OUTPATIENT
Start: 2024-02-06 | End: 2024-02-06 | Stop reason: HOSPADM

## 2024-02-06 RX ORDER — DICLOFENAC SODIUM 75 MG/1
75 TABLET, DELAYED RELEASE ORAL 2 TIMES DAILY
Qty: 60 TABLET | Refills: 1 | Status: SHIPPED | OUTPATIENT
Start: 2024-02-06

## 2024-02-06 RX ORDER — SODIUM CHLORIDE 9 MG/ML
INJECTION, SOLUTION INTRAVENOUS PRN
Status: DISCONTINUED | OUTPATIENT
Start: 2024-02-06 | End: 2024-02-06 | Stop reason: HOSPADM

## 2024-02-06 RX ORDER — SODIUM CHLORIDE 0.9 % (FLUSH) 0.9 %
5-40 SYRINGE (ML) INJECTION EVERY 12 HOURS SCHEDULED
Status: CANCELLED | OUTPATIENT
Start: 2024-02-06

## 2024-02-06 RX ORDER — MIDAZOLAM HYDROCHLORIDE 1 MG/ML
INJECTION INTRAMUSCULAR; INTRAVENOUS PRN
Status: DISCONTINUED | OUTPATIENT
Start: 2024-02-06 | End: 2024-02-06 | Stop reason: SDUPTHER

## 2024-02-06 RX ORDER — FENTANYL CITRATE 50 UG/ML
INJECTION, SOLUTION INTRAMUSCULAR; INTRAVENOUS PRN
Status: DISCONTINUED | OUTPATIENT
Start: 2024-02-06 | End: 2024-02-06 | Stop reason: SDUPTHER

## 2024-02-06 RX ORDER — DEXAMETHASONE SODIUM PHOSPHATE 4 MG/ML
INJECTION, SOLUTION INTRA-ARTICULAR; INTRALESIONAL; INTRAMUSCULAR; INTRAVENOUS; SOFT TISSUE PRN
Status: DISCONTINUED | OUTPATIENT
Start: 2024-02-06 | End: 2024-02-06 | Stop reason: SDUPTHER

## 2024-02-06 RX ORDER — ONDANSETRON 2 MG/ML
4 INJECTION INTRAMUSCULAR; INTRAVENOUS EVERY 6 HOURS PRN
Status: CANCELLED | OUTPATIENT
Start: 2024-02-06

## 2024-02-06 RX ORDER — ACETAMINOPHEN 500 MG
1000 TABLET ORAL EVERY 6 HOURS
Status: CANCELLED | OUTPATIENT
Start: 2024-02-06

## 2024-02-06 RX ORDER — MIDAZOLAM HYDROCHLORIDE 2 MG/2ML
2 INJECTION, SOLUTION INTRAMUSCULAR; INTRAVENOUS
Status: DISCONTINUED | OUTPATIENT
Start: 2024-02-06 | End: 2024-02-06 | Stop reason: HOSPADM

## 2024-02-06 RX ORDER — FAMOTIDINE 20 MG/1
20 TABLET, FILM COATED ORAL 2 TIMES DAILY
Status: CANCELLED | OUTPATIENT
Start: 2024-02-06

## 2024-02-06 RX ORDER — HYDRALAZINE HYDROCHLORIDE 20 MG/ML
10 INJECTION INTRAMUSCULAR; INTRAVENOUS
Status: DISCONTINUED | OUTPATIENT
Start: 2024-02-06 | End: 2024-02-06 | Stop reason: HOSPADM

## 2024-02-06 RX ORDER — OXYCODONE HYDROCHLORIDE 5 MG/1
10 TABLET ORAL EVERY 4 HOURS PRN
Status: CANCELLED | OUTPATIENT
Start: 2024-02-06

## 2024-02-06 RX ORDER — SODIUM CHLORIDE 0.9 % (FLUSH) 0.9 %
5-40 SYRINGE (ML) INJECTION PRN
Status: CANCELLED | OUTPATIENT
Start: 2024-02-06

## 2024-02-06 RX ORDER — ONDANSETRON 2 MG/ML
4 INJECTION INTRAMUSCULAR; INTRAVENOUS
Status: DISCONTINUED | OUTPATIENT
Start: 2024-02-06 | End: 2024-02-06 | Stop reason: HOSPADM

## 2024-02-06 RX ORDER — OXYCODONE HYDROCHLORIDE 5 MG/1
5 TABLET ORAL
Status: DISCONTINUED | OUTPATIENT
Start: 2024-02-06 | End: 2024-02-06 | Stop reason: HOSPADM

## 2024-02-06 RX ORDER — FENTANYL CITRATE 50 UG/ML
100 INJECTION, SOLUTION INTRAMUSCULAR; INTRAVENOUS
Status: DISCONTINUED | OUTPATIENT
Start: 2024-02-06 | End: 2024-02-06 | Stop reason: HOSPADM

## 2024-02-06 RX ORDER — SODIUM CHLORIDE 0.9 % (FLUSH) 0.9 %
5-40 SYRINGE (ML) INJECTION EVERY 12 HOURS SCHEDULED
Status: DISCONTINUED | OUTPATIENT
Start: 2024-02-06 | End: 2024-02-06 | Stop reason: HOSPADM

## 2024-02-06 RX ORDER — DIPHENHYDRAMINE HYDROCHLORIDE 50 MG/ML
12.5 INJECTION INTRAMUSCULAR; INTRAVENOUS
Status: DISCONTINUED | OUTPATIENT
Start: 2024-02-06 | End: 2024-02-06 | Stop reason: HOSPADM

## 2024-02-06 RX ORDER — FENTANYL CITRATE 50 UG/ML
25 INJECTION, SOLUTION INTRAMUSCULAR; INTRAVENOUS EVERY 5 MIN PRN
Status: DISCONTINUED | OUTPATIENT
Start: 2024-02-06 | End: 2024-02-06 | Stop reason: HOSPADM

## 2024-02-06 RX ORDER — GUAIFENESIN 400 MG/1
400 TABLET ORAL 4 TIMES DAILY PRN
Status: CANCELLED | OUTPATIENT
Start: 2024-02-06

## 2024-02-06 RX ORDER — SODIUM CHLORIDE 0.9 % (FLUSH) 0.9 %
5-40 SYRINGE (ML) INJECTION PRN
Status: DISCONTINUED | OUTPATIENT
Start: 2024-02-06 | End: 2024-02-06 | Stop reason: HOSPADM

## 2024-02-06 RX ORDER — ROCURONIUM BROMIDE 10 MG/ML
INJECTION, SOLUTION INTRAVENOUS PRN
Status: DISCONTINUED | OUTPATIENT
Start: 2024-02-06 | End: 2024-02-06 | Stop reason: SDUPTHER

## 2024-02-06 RX ORDER — ACETAMINOPHEN 500 MG
1000 TABLET ORAL ONCE
Status: COMPLETED | OUTPATIENT
Start: 2024-02-06 | End: 2024-02-06

## 2024-02-06 RX ORDER — GABAPENTIN 300 MG/1
300 CAPSULE ORAL NIGHTLY
Status: CANCELLED | OUTPATIENT
Start: 2024-02-06

## 2024-02-06 RX ORDER — POLYETHYLENE GLYCOL 3350 17 G/17G
17 POWDER, FOR SOLUTION ORAL DAILY
Status: CANCELLED | OUTPATIENT
Start: 2024-02-06

## 2024-02-06 RX ORDER — NALOXONE HYDROCHLORIDE 4 MG/.1ML
1 SPRAY NASAL PRN
Qty: 1 EACH | Refills: 0 | Status: SHIPPED | OUTPATIENT
Start: 2024-02-06

## 2024-02-06 RX ORDER — DIPHENHYDRAMINE HYDROCHLORIDE 50 MG/ML
25 INJECTION INTRAMUSCULAR; INTRAVENOUS EVERY 6 HOURS PRN
Status: CANCELLED | OUTPATIENT
Start: 2024-02-06

## 2024-02-06 RX ORDER — IPRATROPIUM BROMIDE AND ALBUTEROL SULFATE 2.5; .5 MG/3ML; MG/3ML
1 SOLUTION RESPIRATORY (INHALATION)
Status: DISCONTINUED | OUTPATIENT
Start: 2024-02-06 | End: 2024-02-06 | Stop reason: HOSPADM

## 2024-02-06 RX ORDER — CETIRIZINE HYDROCHLORIDE 10 MG/1
10 TABLET ORAL DAILY
Status: CANCELLED | OUTPATIENT
Start: 2024-02-06

## 2024-02-06 RX ORDER — CYCLOBENZAPRINE HCL 10 MG
10 TABLET ORAL EVERY 12 HOURS PRN
Status: CANCELLED | OUTPATIENT
Start: 2024-02-06

## 2024-02-06 RX ORDER — MORPHINE SULFATE 2 MG/ML
2 INJECTION, SOLUTION INTRAMUSCULAR; INTRAVENOUS
Status: CANCELLED | OUTPATIENT
Start: 2024-02-06

## 2024-02-06 RX ORDER — ONDANSETRON 2 MG/ML
INJECTION INTRAMUSCULAR; INTRAVENOUS PRN
Status: DISCONTINUED | OUTPATIENT
Start: 2024-02-06 | End: 2024-02-06 | Stop reason: SDUPTHER

## 2024-02-06 RX ORDER — OXYCODONE HYDROCHLORIDE 5 MG/1
5 TABLET ORAL
Qty: 50 TABLET | Refills: 0 | Status: SHIPPED | OUTPATIENT
Start: 2024-02-06 | End: 2024-02-20

## 2024-02-06 RX ORDER — ALBUTEROL SULFATE 90 UG/1
2 AEROSOL, METERED RESPIRATORY (INHALATION) EVERY 6 HOURS PRN
Status: CANCELLED | OUTPATIENT
Start: 2024-02-06

## 2024-02-06 RX ORDER — OXYCODONE HYDROCHLORIDE 5 MG/1
5 TABLET ORAL EVERY 4 HOURS PRN
Status: CANCELLED | OUTPATIENT
Start: 2024-02-06

## 2024-02-06 RX ORDER — SENNA AND DOCUSATE SODIUM 50; 8.6 MG/1; MG/1
1 TABLET, FILM COATED ORAL 2 TIMES DAILY
Status: CANCELLED | OUTPATIENT
Start: 2024-02-06

## 2024-02-06 RX ORDER — ONDANSETRON 4 MG/1
4 TABLET, ORALLY DISINTEGRATING ORAL EVERY 8 HOURS PRN
Status: CANCELLED | OUTPATIENT
Start: 2024-02-06

## 2024-02-06 RX ORDER — KETAMINE HCL IN NACL, ISO-OSM 100MG/10ML
SYRINGE (ML) INJECTION PRN
Status: DISCONTINUED | OUTPATIENT
Start: 2024-02-06 | End: 2024-02-06 | Stop reason: SDUPTHER

## 2024-02-06 RX ADMIN — HYDROMORPHONE HYDROCHLORIDE 0.5 MG: 1 INJECTION, SOLUTION INTRAMUSCULAR; INTRAVENOUS; SUBCUTANEOUS at 09:43

## 2024-02-06 RX ADMIN — PROPOFOL 50 MG: 10 INJECTION, EMULSION INTRAVENOUS at 08:08

## 2024-02-06 RX ADMIN — PROPOFOL 250 MG: 10 INJECTION, EMULSION INTRAVENOUS at 07:33

## 2024-02-06 RX ADMIN — FENTANYL CITRATE 25 MCG: 50 INJECTION INTRAMUSCULAR; INTRAVENOUS at 09:59

## 2024-02-06 RX ADMIN — HYDROMORPHONE HYDROCHLORIDE 0.5 MG: 1 INJECTION, SOLUTION INTRAMUSCULAR; INTRAVENOUS; SUBCUTANEOUS at 09:15

## 2024-02-06 RX ADMIN — FENTANYL CITRATE 25 MCG: 50 INJECTION INTRAMUSCULAR; INTRAVENOUS at 09:24

## 2024-02-06 RX ADMIN — WATER 2000 MG: 1 INJECTION INTRAMUSCULAR; INTRAVENOUS; SUBCUTANEOUS at 07:40

## 2024-02-06 RX ADMIN — FENTANYL CITRATE 50 MCG: 50 INJECTION, SOLUTION INTRAMUSCULAR; INTRAVENOUS at 07:33

## 2024-02-06 RX ADMIN — Medication 30 MG: at 07:40

## 2024-02-06 RX ADMIN — ONDANSETRON 4 MG: 2 INJECTION INTRAMUSCULAR; INTRAVENOUS at 08:40

## 2024-02-06 RX ADMIN — LIDOCAINE HYDROCHLORIDE 80 MG: 20 INJECTION, SOLUTION EPIDURAL; INFILTRATION; INTRACAUDAL; PERINEURAL at 07:33

## 2024-02-06 RX ADMIN — MIDAZOLAM 2 MG: 1 INJECTION INTRAMUSCULAR; INTRAVENOUS at 07:25

## 2024-02-06 RX ADMIN — Medication 120 MG: at 07:33

## 2024-02-06 RX ADMIN — FENTANYL CITRATE 25 MCG: 50 INJECTION INTRAMUSCULAR; INTRAVENOUS at 09:18

## 2024-02-06 RX ADMIN — SODIUM CHLORIDE: 9 INJECTION, SOLUTION INTRAVENOUS at 09:57

## 2024-02-06 RX ADMIN — ROCURONIUM BROMIDE 5 MG: 10 SOLUTION INTRAVENOUS at 07:33

## 2024-02-06 RX ADMIN — FENTANYL CITRATE 50 MCG: 50 INJECTION, SOLUTION INTRAMUSCULAR; INTRAVENOUS at 08:08

## 2024-02-06 RX ADMIN — SODIUM CHLORIDE, POTASSIUM CHLORIDE, SODIUM LACTATE AND CALCIUM CHLORIDE: 600; 310; 30; 20 INJECTION, SOLUTION INTRAVENOUS at 06:26

## 2024-02-06 RX ADMIN — HYDROMORPHONE HYDROCHLORIDE 0.5 MG: 1 INJECTION, SOLUTION INTRAMUSCULAR; INTRAVENOUS; SUBCUTANEOUS at 07:25

## 2024-02-06 RX ADMIN — ACETAMINOPHEN 1000 MG: 500 TABLET ORAL at 06:29

## 2024-02-06 RX ADMIN — PROPOFOL 125 MCG/KG/MIN: 10 INJECTION, EMULSION INTRAVENOUS at 07:40

## 2024-02-06 RX ADMIN — DEXAMETHASONE SODIUM PHOSPHATE 8 MG: 4 INJECTION, SOLUTION INTRAMUSCULAR; INTRAVENOUS at 07:40

## 2024-02-06 ASSESSMENT — PAIN SCALES - GENERAL
PAINLEVEL_OUTOF10: 3
PAINLEVEL_OUTOF10: 10
PAINLEVEL_OUTOF10: 4
PAINLEVEL_OUTOF10: 10
PAINLEVEL_OUTOF10: 5
PAINLEVEL_OUTOF10: 3
PAINLEVEL_OUTOF10: 7
PAINLEVEL_OUTOF10: 10

## 2024-02-06 ASSESSMENT — PAIN DESCRIPTION - ORIENTATION: ORIENTATION: RIGHT

## 2024-02-06 ASSESSMENT — PAIN - FUNCTIONAL ASSESSMENT: PAIN_FUNCTIONAL_ASSESSMENT: PREVENTS OR INTERFERES WITH ALL ACTIVE AND SOME PASSIVE ACTIVITIES

## 2024-02-06 ASSESSMENT — PAIN DESCRIPTION - DESCRIPTORS: DESCRIPTORS: ACHING;BURNING;SHARP

## 2024-02-06 ASSESSMENT — PAIN DESCRIPTION - LOCATION: LOCATION: ARM

## 2024-02-06 ASSESSMENT — PAIN DESCRIPTION - FREQUENCY: FREQUENCY: CONTINUOUS

## 2024-02-06 ASSESSMENT — PAIN DESCRIPTION - PAIN TYPE: TYPE: ACUTE PAIN

## 2024-02-06 NOTE — BRIEF OP NOTE
Brief Postoperative Note      Patient: William Us  YOB: 1975  MRN: [id number]    Date of Procedure: 2/6/2024    Pre-Op Diagnosis Codes:     * Cervical radiculitis [M54.12]     * Neck pain [M54.2]    Post-Op Diagnosis: Same       Procedure(s):  C5/6 DISC ARTHROPLASTY    Surgeon(s):  Shakir Lacy MD    Assistant:  Physician Assistant: Shanice Melo PA    Anesthesia: General    Estimated Blood Loss (mL): Minimal    Complications: None    Specimens:   * No specimens in log *    Implants:  Implant Name Type Inv. Item Serial No.  Lot No. LRB No. Used Action   GRAFT HUM TISS 3X4 CM WND COVERING AMNIO MEMBRN VERSASHIELD - [id number]  GRAFT HUM TISS 3X4 CM WND COVERING AMNIO MEMBRN VERSASHIELD [id number] [employer]- N/A N/A 1 Implanted   DISC CERV H6MM [id number] TI CERAMIC PROS PRESTIGE LP - SN/A  DISC CERV H6MM [id number] TI CERAMIC PROS PRESTIGE LP N/A [employer]ANE[employer]-WD [id number] N/A 1 Implanted         Drains: * No LDAs found *    Findings: HNP      Electronically signed by LEANA Koch on 2/6/2024 at 8:56 AM

## 2024-02-06 NOTE — ANESTHESIA PRE PROCEDURE
Temp: 98 °F (36.7 °C)   TempSrc: Oral   SpO2: 99%   Weight: 72.4 kg (159 lb 9.6 oz)   Height: 1.803 m (5' 10.98\")                                              BP Readings from Last 3 Encounters:   02/06/24 128/84   02/05/24 123/72       NPO Status: Time of last liquid consumption: 0400                        Time of last solid consumption: 1930                        Date of last liquid consumption: 02/06/24                        Date of last solid food consumption: 02/05/24    BMI:   Wt Readings from Last 3 Encounters:   02/06/24 72.4 kg (159 lb 9.6 oz)   02/05/24 72.4 kg (159 lb 9.6 oz)   01/19/24 65.8 kg (145 lb)     Body mass index is 22.27 kg/m².    CBC:   Lab Results   Component Value Date/Time    WBC 12.9 04/17/2022 02:03 PM    RBC 4.15 04/17/2022 02:03 PM    HGB 13.3 04/17/2022 02:03 PM    HCT 37.7 04/17/2022 02:03 PM    MCV 90.8 04/17/2022 02:03 PM    RDW 11.5 04/17/2022 02:03 PM     04/17/2022 02:03 PM       CMP:   Lab Results   Component Value Date/Time     04/17/2022 02:03 PM    K 4.0 04/17/2022 02:03 PM     04/17/2022 02:03 PM    CO2 26 04/17/2022 02:03 PM    BUN 15 04/17/2022 02:03 PM    CREATININE 0.82 04/17/2022 02:03 PM    GFRAA >60 04/17/2022 02:03 PM    AGRATIO 1.3 04/17/2022 02:03 PM    GLUCOSE 105 04/17/2022 02:03 PM    PROT 6.8 04/17/2022 02:03 PM    CALCIUM 8.9 04/17/2022 02:03 PM    BILITOT 0.5 04/17/2022 02:03 PM    ALKPHOS 103 04/17/2022 02:03 PM    AST 23 04/17/2022 02:03 PM    ALT 39 04/17/2022 02:03 PM       POC Tests: No results for input(s): \"POCGLU\", \"POCNA\", \"POCK\", \"POCCL\", \"POCBUN\", \"POCHEMO\", \"POCHCT\" in the last 72 hours.    Coags: No results found for: \"PROTIME\", \"INR\", \"APTT\"    HCG (If Applicable): No results found for: \"PREGTESTUR\", \"PREGSERUM\", \"HCG\", \"HCGQUANT\"     ABGs: No results found for: \"PHART\", \"PO2ART\", \"OOL4HUP\", \"UXY9DEM\", \"BEART\", \"S2EHHYTA\"     Type & Screen (If Applicable):  No results found for: \"LABABO\", \"LABRH\"    Drug/Infectious Status

## 2024-02-06 NOTE — PROGRESS NOTES
Discharge instructions reviewed with patient and family using teachback. All questions have been answered. Prescriptions sent to [facility] pharmacy. Vital signs stable, pain appropriately managed. Patient wheeled off the unit with PACU staff.

## 2024-02-06 NOTE — OP NOTE
[facility]  OPERATIVE REPORT    Name:  SONIA UP  MR#:  [id number]  :  1975  ACCOUNT #:  [id number]  DATE OF SERVICE:  2024      PREOPERATIVE DIAGNOSES:  Cervical herniated nucleus pulposus, C5-6; and cervical radiculopathy.    POSTOPERATIVE DIAGNOSES:  Cervical herniated nucleus pulposus, C5-6; and cervical radiculopathy.    PROCEDURES PERFORMED:  1.  Anterior cervical diskectomy, C5-6.  2.  Anterior cervical disk arthroplasty, C5-6.    SURGEON:  Shakir Lacy MD    ASSISTANT:  Shanice Melo PA-C    ANESTHESIA:  General.    COMPLICATIONS:  None.    SPECIMENS REMOVED:  None.    IMPLANTS:  Include Medtronic View Medical LP implant.    ESTIMATED BLOOD LOSS:  Minimal.    DRAINS:  None.    INDICATIONS:  This is a 49-year-old gentleman with an acute onset of right-sided cervical radiculopathy from the broad-based disk protrusion C5-6 caused severe foraminal stenosis.  He had failed conservative treatment and he is for decompression and cervical disk replacement because of his young age.    PROCEDURE:  The patient was identified, brought to the operative suite, and underwent general anesthesia without difficulty.  Preoperative neuromonitoring was placed and baselines obtained and these remained stable throughout the surgical procedure.  Preoperative antibiotics were also given to the patient.  The patient placed in the neutral supine position with the neck in 10-pound traction across the head with chin strap.  Neck was prepped and draped sterilely.  Time-out confirmed.  Fluoroscopy was brought in and we marked out the appropriate level at the C5-6 disk space.  Transverse incision was made on the left-hand side.  Dissection was carried down to the platysma.  This was split longitudinally.  Blunt dissection medial to the sternocleidomastoid down to deep cervical fascia.  At which time, we then confirmed that the appropriate level had been obtained.  We elevated the longus

## 2024-02-06 NOTE — ANESTHESIA POSTPROCEDURE EVALUATION
Post-Anesthesia Evaluation and Assessment    Patient: William Us MRN: [id number]  SSN: [id number]    YOB: 1975  Age: 49 y.o.  Sex: male      I have evaluated the patient and they are stable and ready for discharge from the PACU.     Cardiovascular Function/Vital Signs  Visit Vitals  BP (!) 147/82   Pulse 72   Temp 97.9 °F (36.6 °C) (Oral)   Resp 14   Ht 1.803 m (5' 10.98\")   Wt 72.4 kg (159 lb 9.6 oz)   SpO2 98%   BMI 22.27 kg/m²       Patient is status post General anesthesia for Procedure(s):  C5/6 DISC ARTHROPLASTY.    Nausea/Vomiting: None    Postoperative hydration reviewed and adequate.    Pain:      Managed    Neurological Status:       At baseline    Mental Status, Level of Consciousness: Alert and  oriented to person, place, and time    Pulmonary Status:       Adequate oxygenation and airway patent    Complications related to anesthesia: None    Post-anesthesia assessment completed. No concerns    Signed By: Merlin Palenica MD     February 6, 2024            Department of Anesthesiology  Postprocedure Note    Patient: William Us  MRN: [id number]  YOB: 1975  Date of evaluation: 2/6/2024    Procedure Summary       Date: 02/06/24 Room / Location: [facility] MAIN OR [address] [place] [address] MAIN OR    Anesthesia Start: 0725 Anesthesia Stop: 0906    Procedure: C5/6 DISC ARTHROPLASTY (Spine Cervical) Diagnosis:       Cervical radiculitis      Neck pain      (Cervical radiculitis [M54.12])      (Neck pain [M54.2])    Providers: Shakir Lacy MD Responsible Provider: Merlin Palencia MD    Anesthesia Type: general ASA Status: 2            Anesthesia Type: No value filed.    Joon Phase I: Joon Score: 10    Joon Phase II: Joon Score: 10    Anesthesia Post Evaluation    No notable events documented.

## 2024-02-07 LAB
BACTERIA SPEC CULT: NORMAL
BACTERIA SPEC CULT: NORMAL
SERVICE CMNT-IMP: NORMAL

## 2025-02-10 NOTE — DISCHARGE INSTRUCTIONS
After Hospital Care Plan:  Discharge Instructions Cervical Disc Arthropasty Spine Surgery Dr. Lacy    Follow up appointments   -follow up with Dr. Lacy in 2 weeks.  Call [phone] to make an appointment as soon as you get home from the hospital.    When to call your Orthopaedic Surgeon:  -Difficulty swallowing that is worse than when you left the hospital.  -Signs of infection-if your incision is red; continues to have drainage; drainage has a foul odor or if you have a persistent fever over 101 degrees for 24 hours  -nausea or vomiting, severe headache  -changes in sensation in your arms or legs (numbness, tingling, loss of color)  -increased weakness-greater than before your surgery  -severe pain or pain not relieved by medications  -Signs of a blood clot in your leg-calf pain, tenderness, redness, swelling of lower leg    When to call your Primary Care Physician:  -Concerns about medical conditions such as diabetes, high blood pressure, asthma, congestive heart failure  -Call if blood sugars are elevated, persistent headache or dizziness, coughing or congestion, constipation or diarrhea, burning with urination, abnormal heart rate    When to call 911 and go to the nearest emergency room:  -acute onset of chest pain, shortness of breath, difficulty breathing    Activity  - You are going home a well person, be as active as possible.  Your only exercise should be walking.  Start with short frequent walks and increase your walking distance each day.  -Limit the amount of time you sit to 20-30 minute intervals.  Sitting for prolonged periods of time will be uncomfortable for you following surgery.  - Do NOT lift anything over 5 pounds  -From now on, even when lifting light weight, bend with your knees and not your back.  -Do NOT do any neck exercises until you have been instructed by your doctor  -When you are in bed, you may lay on your back or on either side.  Do NOT lie on your  319.8

## (undated) DEVICE — GLOVE SURG SZ 75 L12IN FNGR THK94MIL STD WHT LTX FREE

## (undated) DEVICE — SUTURE STRATAFIX SPRL MCRYL + SZ 3 0 L8IN ABSRB UD L26MM SH SXMP1B427

## (undated) DEVICE — BIT 6972118 RAIL CUTTER 1.5MM DIAMOND

## (undated) DEVICE — TAPE,CLOTH/SILK,CURAD,3"X10YD,LF,40/CS: Brand: CURAD

## (undated) DEVICE — DRAPE SURG W41XL74IN CLR FULL SZ C ARM 3 ADH POLY STRP E

## (undated) DEVICE — PAD,NON-ADHERENT,3X8,STERILE,LF,1/PK: Brand: MEDLINE

## (undated) DEVICE — TOOL 14MH30 LEGEND 14CM 3MM: Brand: MIDAS REX ™

## (undated) DEVICE — ADHESIVE LIQ 2OZ ADJUNCT FOR DSG MASTISOL

## (undated) DEVICE — HALTER TRACTION HD W/ TRI COTTON LINING FOAM LTX

## (undated) DEVICE — COVER,MAYO STAND,STERILE: Brand: MEDLINE

## (undated) DEVICE — SUTURE ABSRB L30CM 2-0 VLT SPRL PDS + STRATAFIX SXPP1B410

## (undated) DEVICE — 4-PORT MANIFOLD: Brand: NEPTUNE 2

## (undated) DEVICE — SOLUTION IV 250ML 0.9% SOD CHL CLR INJ FLX BG CONT PRT CLSR

## (undated) DEVICE — ANTERIOR CERVICAL-SMH: Brand: MEDLINE INDUSTRIES, INC.

## (undated) DEVICE — GLOVE SURG SZ 8 L12IN FNGR THK79MIL GRN LTX FREE

## (undated) DEVICE — BONE WAX WHITE: Brand: BONE WAX WHITE

## (undated) DEVICE — BIPOLAR IRRIGATOR INTEGRATED TUBING AND BIPOLAR CORD SET, DISPOSABLE